# Patient Record
Sex: MALE | Race: BLACK OR AFRICAN AMERICAN | URBAN - METROPOLITAN AREA
[De-identification: names, ages, dates, MRNs, and addresses within clinical notes are randomized per-mention and may not be internally consistent; named-entity substitution may affect disease eponyms.]

---

## 2021-08-09 ENCOUNTER — WEB ENCOUNTER (OUTPATIENT)
Dept: URBAN - METROPOLITAN AREA CLINIC 109 | Facility: CLINIC | Age: 66
End: 2021-08-09

## 2021-08-09 ENCOUNTER — OFFICE VISIT (OUTPATIENT)
Dept: URBAN - METROPOLITAN AREA CLINIC 109 | Facility: CLINIC | Age: 66
End: 2021-08-09
Payer: MEDICARE

## 2021-08-09 DIAGNOSIS — I25.10 CORONARY ARTERY DISEASE INVOLVING NATIVE CORONARY ARTERY OF NATIVE HEART WITHOUT ANGINA PECTORIS: ICD-10-CM

## 2021-08-09 DIAGNOSIS — R19.4 CHANGE IN BOWEL HABITS: ICD-10-CM

## 2021-08-09 DIAGNOSIS — I50.9 CONGESTIVE HEART FAILURE, UNSPECIFIED HF CHRONICITY, UNSPECIFIED HEART FAILURE TYPE: ICD-10-CM

## 2021-08-09 DIAGNOSIS — J44.9 CHRONIC OBSTRUCTIVE PULMONARY DISEASE, UNSPECIFIED COPD TYPE: ICD-10-CM

## 2021-08-09 PROBLEM — 42343007 CONGESTIVE HEART FAILURE: Status: ACTIVE | Noted: 2021-08-09

## 2021-08-09 PROBLEM — 443502000: Status: ACTIVE | Noted: 2021-08-09

## 2021-08-09 PROBLEM — 3723001 ARTHRITIS: Status: ACTIVE | Noted: 2021-08-09

## 2021-08-09 PROBLEM — 13645005 COPD - CHRONIC OBSTRUCTIVE PULMONARY DISEASE: Status: ACTIVE | Noted: 2021-08-09

## 2021-08-09 PROBLEM — 13645005: Status: ACTIVE | Noted: 2021-08-09

## 2021-08-09 PROBLEM — 42343007: Status: ACTIVE | Noted: 2021-08-09

## 2021-08-09 PROCEDURE — 99204 OFFICE O/P NEW MOD 45 MIN: CPT | Performed by: INTERNAL MEDICINE

## 2021-08-09 RX ORDER — ATORVASTATIN CALCIUM 20 MG/1
1 TABLET TABLET, FILM COATED ORAL ONCE A DAY
Status: ACTIVE | COMMUNITY

## 2021-08-09 RX ORDER — OMEPRAZOLE 40 MG/1
1 CAPSULE 30 MINUTES BEFORE MORNING MEAL CAPSULE, DELAYED RELEASE ORAL ONCE A DAY
Status: ACTIVE | COMMUNITY

## 2021-08-09 RX ORDER — COLCHICINE 0.6 MG/1
1 TABLET TABLET, FILM COATED ORAL
Status: ACTIVE | COMMUNITY

## 2021-08-09 NOTE — HPI-TODAY'S VISIT:
The patient has been referred for possible colonoscopy.  He has had a change in bowel habits the past year with mostly soft to semi  liquid stools, 1-2x per day.  He is on colchicine, but takes it only as needed.  Patient has been on omeprazole for  GERD for 2 years.  He drinks milk most days and eats ice cream.  No weight loss, or blood in the stool.  Denies abdominal pain. Colonoscopy in 2016 was normal.  No FH of colon polyps or colon cancer.  [facility] is notable for hx CHF with a low EF% and COPD, gout, HTN and GERD.  He is onEliquis.  He has a pacemaker/defibrillator since May 2020 for a history of CHF.  He is followed at [facility], Dr. Gonzalo Cowan. The patient had elevated LFTs in 2015 when last seen.

## 2021-12-07 ENCOUNTER — OFFICE VISIT (OUTPATIENT)
Dept: URBAN - METROPOLITAN AREA CLINIC 109 | Facility: CLINIC | Age: 66
End: 2021-12-07
Payer: MEDICARE

## 2021-12-07 ENCOUNTER — DASHBOARD ENCOUNTERS (OUTPATIENT)
Age: 66
End: 2021-12-07

## 2021-12-07 DIAGNOSIS — R19.4 CHANGE IN BOWEL HABITS: ICD-10-CM

## 2021-12-07 DIAGNOSIS — E73.9 LACTOSE INTOLERANCE: ICD-10-CM

## 2021-12-07 PROBLEM — 782415009: Status: ACTIVE | Noted: 2021-12-07

## 2021-12-07 PROCEDURE — 99212 OFFICE O/P EST SF 10 MIN: CPT | Performed by: INTERNAL MEDICINE

## 2021-12-07 RX ORDER — ATORVASTATIN CALCIUM 20 MG/1
1 TABLET TABLET, FILM COATED ORAL ONCE A DAY
Status: ACTIVE | COMMUNITY

## 2021-12-07 RX ORDER — COLCHICINE 0.6 MG/1
1 TABLET TABLET, FILM COATED ORAL
Status: ACTIVE | COMMUNITY

## 2021-12-07 RX ORDER — OMEPRAZOLE 40 MG/1
1 CAPSULE 30 MINUTES BEFORE MORNING MEAL CAPSULE, DELAYED RELEASE ORAL ONCE A DAY
Status: ACTIVE | COMMUNITY

## 2021-12-07 NOTE — HPI-TODAY'S VISIT:
The patient returns in f/u for altered bowel habits.  He was eating ice cream and cheese daily and having loos stools suspected to be due to lactose intolerance.  Lactose restriction was advised. He is much better with lactose restriction.  No abdominal pain.  Colonoscopy in 2016 was normal.  No FH of colon polyps or colon cancer.  [facility] is notable for hx CHF with a low EF% and COPD, gout, HTN and GERD.  He is onEliquis.  He has a pacemaker/defibrillator since May 2020 for a history of CHF.  He is followed at [facility], Dr. Gonzalo Cowan. The patient had elevated LFTs in 2015 when last seen.

## 2021-12-07 NOTE — PHYSICAL EXAM GASTROINTESTINAL
Abdomen , soft, nontender, nondistended , no guarding or rigidity , no masses palpable , normal bowel sounds , Liver and Spleen , no hepatomegaly present , no hepatosplenomegaly , liver nontender , spleen not palpable, vertical scar in the epigastrium